# Patient Record
Sex: MALE | Race: WHITE | NOT HISPANIC OR LATINO | Employment: UNEMPLOYED | URBAN - NONMETROPOLITAN AREA
[De-identification: names, ages, dates, MRNs, and addresses within clinical notes are randomized per-mention and may not be internally consistent; named-entity substitution may affect disease eponyms.]

---

## 2021-10-07 ENCOUNTER — HOSPITAL ENCOUNTER (EMERGENCY)
Facility: HOSPITAL | Age: 31
Discharge: LEFT BEFORE DISCHARGE | End: 2021-10-07
Attending: STUDENT IN AN ORGANIZED HEALTH CARE EDUCATION/TRAINING PROGRAM | Admitting: EMERGENCY MEDICINE

## 2021-10-07 VITALS
OXYGEN SATURATION: 98 % | DIASTOLIC BLOOD PRESSURE: 92 MMHG | RESPIRATION RATE: 20 BRPM | WEIGHT: 155 LBS | SYSTOLIC BLOOD PRESSURE: 141 MMHG | HEIGHT: 72 IN | TEMPERATURE: 97.4 F | HEART RATE: 74 BPM | BODY MASS INDEX: 20.99 KG/M2

## 2021-10-07 PROCEDURE — 99281 EMR DPT VST MAYX REQ PHY/QHP: CPT

## 2021-10-08 NOTE — ED NOTES
Called for labs with no answer; searched ED and surrounding areas. Patient not found.      Vira Hensley, RN  10/07/21 [id number]

## 2021-10-08 NOTE — ED NOTES
Called for labs with no answer; searched ED and surrounding areas. Patient not found.      Vira Hensley, RN  10/07/21 [id number]

## 2021-10-08 NOTE — ED NOTES
MEDICAL SCREENING:    Reason for Visit: Patient presents with back and neck pain. He has a history of discitis, reports it feels similar.  He is an IV drug user.     Patient initially seen in triage.  The patient was advised further evaluation and diagnostic testing will be needed, some of the treatment and testing will be initiated in the lobby in order to begin the process.  The patient will be returned to the waiting area for the time being and possibly be re-assessed by a subsequent ED provider.  The patient will be brought back to the treatment area in as timely manner as possible.         Kristy Ignacio, APRN  10/07/21 [id number]

## 2021-10-08 NOTE — ED NOTES
Called for labs with no answer; searched ED and surrounding areas. Patient not found.      Vira Hensley, RN  10/07/21 [id number]

## 2021-10-08 NOTE — ED NOTES
Called patient for room again; still no answer. Patient not found in lobby or surrounding areas. Called for patient several times     Vira Hensley RN  10/07/21 [id number]